# Patient Record
Sex: FEMALE | Race: BLACK OR AFRICAN AMERICAN
[De-identification: names, ages, dates, MRNs, and addresses within clinical notes are randomized per-mention and may not be internally consistent; named-entity substitution may affect disease eponyms.]

---

## 2017-06-10 NOTE — PDOC
History of Present Illness





- General


Chief Complaint: Lightheaded


Stated Complaint: WEAKNESS


Time Seen by Provider: 06/10/17 17:35


History Source: Patient


Exam Limitations: No Limitations





- History of Present Illness


Initial Comments: 


06/10/17 18:21














My chief complaint: Intermittent headache, weakness, lightheadedness times one 

week








History of present illness: Patient is a 32-year-old female with a history of 

fibroids and myomectomy 8/16 today complaining of intermittent temporal 

headache times one week with lightheadedness and generalized weakness 

intermittently. Patient reports having a headache earlier today for which she 

took acetaminophen at 12 noon, denies having headache presently. Patient 

reports feeling lightheaded when she stands up from a sitting or lying position 

intermittently times one week. Patient denies any fever, sore throat, cough, 

nausea vomiting or diarrhea. Patient does report having nasal congestion that 

was worse 2 weeks ago is slightly less presently with clear rhinorrhea. She 

denies any recent travel or any sick contacts. Patient reports that she works 

nights and comes home and sleeps for only 4 hours which is usual for her. 

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 

semester and is less stress than usual. Patient denies any palpitations or any 

shortness of breath. She reports that her menstrual cycle is not heavy like 

before and she just finished it yesterday was for 3 days only. Patient to think 

that she is pregnant due to having her menstrual cycle is not on birth control.


06/10/17 18:28








06/10/17 18:29








06/10/17 18:32





Timing/Duration: 1 week (intermittent lightheadedness, headache b/l frontal, 

weakness intermittent)


Severity: mild


Associated Symptoms: reports: headaches (intermittent headache temporal, none 

now ), other (nasal congestion, weakness, lightheadedness)





Past History





- Past Medical History


Allergies/Adverse Reactions: 


 Allergies











Allergy/AdvReac Type Severity Reaction Status Date / Time


 


oxycodone HCl Allergy Mild Itching Verified 06/10/17 17:21





[From Roxicodone]     


 


mushroom Allergy   Verified 06/10/17 17:21











Home Medications: 


Ambulatory Orders





Cholecalciferol (Vitamin D3) [Vitamin D] 1,000 unit PO DAILY 08/22/16 


Multivit with Iron-Minerals [Compete] 1 each PO DAILY 08/22/16 


Fexofenadine HCl [Allegra Allergy] 180 mg PO DAILY #10 tablet 06/10/17 


Fluticasone Prop 0.05% Nasal [Flonase] 2 spray NS DAILY #1 spraybtl 06/10/17 








Anemia: No


Asthma: No


Cancer: No


Cardiac Disorders: No


CVA: No


COPD: No


CHF: No


Dementia: No


Diabetes: No


GI Disorders: No


 Disorders: No


HTN: No


Hypercholesterolemia: No


Liver Disease: No


Seizures: No


Thyroid Disease: No


Other medical history: DENIES





- Immunization History


Immunization Up to Date: Yes





- Psycho/Social/Smoking Cessation Hx


Anxiety: No


Suicidal Ideation: No


Smoking History: Never smoked


Have you smoked in the past 12 months: No


Hx Alcohol Use: No


Drug/Substance Use Hx: No


Substance Use Type: None


Hx Substance Use Treatment: No





**Review of Systems





- Review of Systems


Able to Perform ROS?: Yes


Constitutional: Yes: Weakness (intermittent )


HEENTM: Yes: Nose Congestion


Respiratory: No: Symptoms reported


Cardiac (ROS): Yes: Lightheadedness


ABD/GI: No: Symptoms Reported


: No: Symptoms Reported


Musculoskeletal: No: Symptoms Reported


Integumentary: No: Symptoms Reported


Neurological: Yes: Headache (intermittent temporal b/l headache daily for one 

week )





*Physical Exam





- Vital Signs


 Last Vital Signs











Temp Pulse Resp BP Pulse Ox


 


 97.8 F   63   16   110/69   100 


 


 06/10/17 17:12  06/10/17 17:12  06/10/17 17:12  06/10/17 17:12  06/10/17 17:12














- Physical Exam


General Appearance: Yes: Appropriately Dressed


HEENT: positive: EOMI, GARRICK, Nasal Congestion (b/l edema of superior turbinates 

rt. greater than left ).  negative: Pharyngeal Erythema, Tonsillar Exudate, 

Tonsillar Erythema


Neck: negative: Lymphadenopathy (R), Lymphadenopathy (L)


Respiratory/Chest: positive: Lungs Clear, Normal Breath Sounds.  negative: 

Chest Tender, Respiratory Distress


Cardiovascular: positive: Regular Rhythm, Regular Rate, S1, S2


Gastrointestinal/Abdominal: positive: Normal Bowel Sounds, Soft.  negative: 

Tender, Organomegaly, Distended, Guarding, Rebound, Tenderness, Hepatomegaly, 

Spleenomegaly


Integumentary: positive: Normal Color


Neurologic: positive: CNs II-XII NML intact, Fully Oriented, Alert, Normal 

Response, Respond to painful stimul, Responsive, Finger to Nose.  negative: 

Numbness, Sensory Deficit





ED Treatment Course





- LABORATORY


CBC & Chemistry Diagram: 


 06/10/17 18:00





 06/10/17 18:00





Medical Decision Making





- Medical Decision Making


06/10/17 18:32


Patient is a 32-year-old female with a history of fibroids and myomectomy 8/16 

with normocytic anemia here today complaining of intermittent temporal headache 

times one week with lightheadedness and generalized weakness intermittently. 

Patient reports having a headache earlier today for which she took 

acetaminophen at 12 noon, denies having headache presently. Patient reports 

feeling lightheaded when she stands up from a sitting or lying position 

intermittently times one week. Patient denies any fever, sore throat, cough, 

nausea vomiting or diarrhea. Patient does report having nasal congestion that 

was worse 2 weeks ago is slightly less presently with clear rhinorrhea. She 

denies any recent travel or any sick contacts. Patient reports that she works 

nights and comes home and sleeps for only 4 hours which is usual for her. 

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 

semester and is less stress than usual. Patient denies any palpitations or any 

shortness of breath. She reports that her menstrual cycle is not heavy like 

before and she just finished it yesterday was for 3 days only. Patient to think 

that she is pregnant due to having her menstrual cycle is not on birth control.


06/10/17 18:28





06/10/17 18:34


r/o worsening anemia


Nasal Congestion 














PLAN:








cbc with diff


bmp


urine hcg








06/10/17 18:36








06/10/17 18:51


 Laboratory Tests











  06/10/17 06/10/17 06/10/17





  18:00 18:00 18:00


 


WBC  3.0 L D  


 


RBC  4.43  


 


Hgb  12.1  D  


 


Hct  38.1  D  


 


MCV  85.9  


 


MCHC  31.9 L  


 


RDW  13.4  


 


Plt Count  154  D  


 


MPV  10.6  


 


Neutrophils %  39.2 L  


 


Lymphocytes %  43.8 H  


 


Monocytes %  8.2  


 


Eosinophils %  7.8 H  


 


Basophils %  1.0  


 


Sodium    141


 


Potassium    4.0


 


Chloride    104


 


Carbon Dioxide    28


 


Anion Gap    9


 


BUN    13  D


 


Creatinine    0.7


 


Random Glucose    79


 


Calcium    9.3


 


Urine HCG, Qual   Negative 














06/10/17 18:56








leukopenia has been chronic 


will discharge to home on allegra 180 mg daily for 10 days


flonase 2 sprays each nostril for 10 days





06/10/17 23:10








06/10/17 23:11








*DC/Admit/Observation/Transfer


Diagnosis at time of Disposition: 


 Nasal congestion





Headache


Qualifiers:


 Headache type: unspecified Headache chronicity pattern: acute headache 

Intractability: not intractable Qualified Code(s): R51 - Headache





- Discharge Dispostion


Disposition: HOME


Condition at time of disposition: Stable





- Prescriptions


Prescriptions: 


Fexofenadine HCl [Allegra Allergy] 180 mg PO DAILY #10 tablet


Fluticasone Prop 0.05% Nasal [Flonase] 2 spray NS DAILY #1 spraybtl





- Referrals


Referrals: 


Niels Martin MD [Primary Care Provider] - 


Karlos De La O MD [Staff Physician] - 





- Patient Instructions


Additional Instructions: 











follow up With ear nose and throat Dr. De La O if symptoms persist











More rest and follow-up with your primary care provider next week











Return to emergency room if symptoms worsen or new symptoms develop














Patient voiced understanding of discharge instructions and all questions were 

answered

## 2018-01-06 NOTE — PDOC
History of Present Illness





- General


Chief Complaint: Cold Symptoms


Stated Complaint: 19 wks preg COUGHING


Time Seen by Provider: 01/06/18 18:39





- History of Present Illness


Initial Comments: 





01/06/18 18:50


CHIEF COMPLAINT: cough, fever





HISTORY OF PRESENT ILLNESS:  33 yo 19 week pregnant F with no significant PMH 

presents to fast track with cough and fever x 2 days. Patient denies sore throat

, vomiting, diarrhea, abdominal pain, vaginal bleeding.





PAST MEDICAL HISTORY: Denies past medical history





FAMILY HISTORY: Denies





SOCIAL HISTORY: Denies tobacco, alcohol, illicit drug use. 





SURGICAL HISTORY: Denies





ALLERGIES: oxycodone, mushroom





REVIEW OF SYSTEMS


General/Constitutional: Denies fever or chills. Denies weakness.





HEENT: Denies change in vision. Denies ear pain or discharge. Denies sore 

throat.





Cardiovascular: Denies chest pain or shortness of breath.





Respiratory: "I'm coughing a lot." Denies wheezing, or hemoptysis.





Gastrointestinal: Denies nausea, vomiting, diarrhea. 





Genitourinary: Denies dysuria, frequency, or change in urination.





Musculoskeletal: Denies joint or muscle swelling or pain. Denies neck or back 

pain.





Skin and breasts: Denies rash or easy bruising.





PHYSICAL EXAM


General Appearance: Well-appearing, appropriately dressed.  No apparent distress

, no intoxication.





HEENT: EOMI, PERRLA, normal ENT inspection, normal voice, TMs normal, pharynx 

normal.  No conjunctival pallor.  No photophobia, scleral icterus.





Neck: Supple.  Trachea midline. No tenderness, rigidity, carotid bruit, stridor

, lymphadenopathy, or thyromegaly. 





Respiratory/Chest: Lungs CTAB.  No shortness of breath, chest tenderness, 

respiratory distress, accessory muscle use. No crackles, rales, rhonchi, stridor

, wheezing, dullness





Cardiovascular: RRR. S1, S2.  No JVD, murmur, bradycardia, tachycardia.





Gastrointestinal/Abdominal: Normal bowel sounds.  Abdomen soft, non-distended.  

No tenderness or rebound tenderness. No  organomegaly, pulsatile mass, guarding

, hernia, hepatomegaly, splenomegaly.





Musculoskeletal/Extremities:  Normal inspection. FROM of all extremities, 

normal capillary refill.  Pelvis Stable.  No CVA tenderness. No tenderness to 

extremities, pedal edema, swelling, erythema or deformity.





Integumentary: Appropriate color, dry, warm.  No cyanosis, erythema, jaundice 

or rash





Neurologic: CNs II-XII intact. Fully oriented, alert.  Appropriate mood/affect. 

Motor strength 5/5.  No appreciable EOM palsy, facial droop or sensory deficit.





Past History





- Past Medical History


Allergies/Adverse Reactions: 


 Allergies











Allergy/AdvReac Type Severity Reaction Status Date / Time


 


oxycodone HCl Allergy Mild Itching Verified 01/06/18 18:18





[From Roxicodone]     


 


mushroom Allergy   Verified 01/06/18 18:18











Home Medications: 


Ambulatory Orders





Multivit with Iron,Minerals [Compete] 1 each PO DAILY 08/22/16 








Anemia: No


Asthma: No


Cancer: No


Cardiac Disorders: No


CVA: No


COPD: No


CHF: No


Dementia: No


Diabetes: No


GI Disorders: No


 Disorders: No


HTN: No


Hypercholesterolemia: No


Liver Disease: No


Seizures: No


Thyroid Disease: No





- Immunization History


Immunization Up to Date: Yes





- Suicide/Smoking/Psychosocial Hx


Smoking History: Never smoked


Have you smoked in the past 12 months: No


Information on smoking cessation initiated: No


Hx Alcohol Use: No


Drug/Substance Use Hx: No


Substance Use Type: None


Hx Substance Use Treatment: No





*Physical Exam





- Vital Signs


 Last Vital Signs











Temp Pulse Resp BP Pulse Ox


 


 98.1 F   77   18   99/63   100 


 


 01/06/18 18:19  01/06/18 18:19  01/06/18 18:19  01/06/18 18:19  01/06/18 18:19














Medical Decision Making





- Medical Decision Making





01/06/18 19:04


33 yo 19 week pregnant F with no significant PMH presents to fast track with 

cough and fever x 2 days. 





-flu swab











*DC/Admit/Observation/Transfer


Diagnosis at time of Disposition: 


 Viral syndrome








- Discharge Dispostion


Disposition: HOME


Condition at time of disposition: Stable


Admit: No





- Referrals


Referrals: 


Carie Leonardo MD [Primary Care Provider] - 





- Patient Instructions


Printed Discharge Instructions:  DI for Viral Upper Respiratory Infection -- 

Adult


Additional Instructions: 


Please continue to take Tylenol and Robitussin for your symptoms.  You may also 

try taking Vitamin C supplements to help increase your recovery time.  Be sure 

to drink plenty of fluids and get lots of rest!! If you develop any vaginal 

bleeding or unusual discharge, abdominal pain, or any new or worsening symptoms

, please return to the ER. 





- Post Discharge Activity


Forms/Work/School Notes:  Back to Work

## 2018-05-17 NOTE — OP
DATE OF OPERATION:  2018

 

PREOPERATIVE DIAGNOSIS:  Previous myomectomy at 38 weeks.

 

OPERATION:  Low transverse  section.

 

POSTOPERATIVE DIAGNOSIS:  Live female infant, previous myomectomy at 38 weeks.

 

SURGEON:  Tom Flores MD 

 

ASSISTANT:  YOLANDA Manzano.  MD unavailable.  

 

ANESTHESIA:  Spinal.

 

ANESTHESIOLOGIST:  Candace Mazariegos MD 

 

ESTIMATED BLOOD LOSS:  600 mL.

 

DESCRIPTION OF PROCEDURE:  The patient was taken to the operating room and 
placed in

supine position, prepped and draped in the usual sterile fashion after spinal

anesthesia and Collins catheter had been inserted.  Previous scar was then 
removed with

a scalpel using a Pfannenstiel skin incision.  Cautery was then used to go 
through

layers of the abdominal wall to the level of the fascia.  Fascia was cut in the

midline.  Cautery was then used to open the fascia in a smiling fashion.  
Kochers

were then used to bluntly and sharply dissect the rectus muscles off the 
fascia. 

Muscles were split in the midline, and peritoneal cavity was then entered and 
carried

upward and downward.  Bladder retractor was then placed, and scalpel was then 
used to

make a low transverse uterine incision.  Incision was carried upward using 
bandage

scissors.  A live female infant was delivered in OT position.  Nose and mouth 
suction

performed.  Shoulders were delivered without difficulty.  Cord was clamped and 
cut. 

Cord blood obtained.  Cord pH obtained.  The infant was handed to the 
neonatologist. 

Placenta was manually extracted from the uterus.  Uterus was exteriorized and 
cleaned

with clean laparotomy pads.  Uterine incision then closed using 0 Biosyn suture 
1st

layer continuous and locking, 2nd layer imbricating the 1st layer.  Hemostasis

achieved using figure-of-eight sutures.  Uterus interiorized.  Tubes and ovaries

noted to be normal.  Abdominal sweep done.  Abdominal cavity cleaned with clean 
lap

pads.  Peritoneum closed using 0 Biosyn suture.  Fascia was then closed using 0

Vicryl suture in 2 parts.  Muscle was approximated using 0 Vicryl suture.  The

subcutaneous was then closed using 0 Vicryl suture.  The skin was then closed 
using

3-0 Vicryl in subcuticular fashion.  The wound was washed and dressed.  The 
patient

tolerated the procedure well.  Steri-Strips placed.  

 

 

 

TOM FLORES M.D.

 

MICHAEL/[id number]

DD: 2018 11:15

DT: 2018 11:26

Job #:  [id number]

MTDD

## 2018-05-17 NOTE — HP
Past Medical History





- Primary Care Physician


PCP:: Jada Gottlieb





- Admission


Chief Complaint: Previous Myomectomy


History of Present Illness: 





34yo  EDC 18 ega 38 week with previous myomectomy for primary 

 section


no ROM bleeding + afm no HAS


History Source: Patient


Limitations to Obtaining History: No Limitations





- Past Medical History


...: 2


...Para: 0


...Term: 0


...: 0


...Spon : 1


...Induced : 0


...Multiple Gestation: 0


...LMP: 17


... Weeks Gestation by Dates: 38.1


...EDC by Dates: 18


...EDC by Sono: 18





- Past Surgical History


Hx Myomectomy: No


Hx Transabdominal Cerclage: No


Additional Surgical History: Abdominal myomectomy





- Smoking History


Smoking history: Never smoked


Have you smoked in the past 12 months: No





- Alcohol/Substance Use


Hx Alcohol Use: No


History of Substance Use: reports: None





- Social History


Usual Living Arrangement: Yes: With Spouse





Home Medications





- Allergies


Allergies/Adverse Reactions: 


 Allergies











Allergy/AdvReac Type Severity Reaction Status Date / Time


 


oxycodone HCl Allergy Mild Itching Verified 18 09:01





[From Roxicodone]     


 


mushroom Allergy   Verified 18 09:01














- Home Medications


Home Medications: 


Ambulatory Orders





Ferrous Sulfate 325 mg PO DAILY 18 


Prenatal Vitamins (Sjr) - 1 tab PO DAILY 18 











Review of Systems





- Review of Systems


Constitutional: reports: No Symptoms


Eyes: reports: No Symptoms


HENT: reports: No Symptoms


Neck: reports: No Symptoms


Cardiovascular: reports: No Symptoms


Respiratory: reports: No Symptoms


Gastrointestinal: reports: No Symptoms


Genitourinary: reports: No Symptoms


Breasts: reports: No Symptoms Reported


Musculoskeletal: reports: No Symptoms


Integumentary: reports: No Symptoms


Neurological: reports: No Symptoms


Endocrine: reports: No Symptoms


Hematology/Lymphatic: reports: No Symptoms


Psychiatric: reports: No Symptoms





Physical Exam - Maternity


Vital Signs: 


 Vital Signs











Temperature  98.2 F   18 08:30


 


Pulse Rate  79   18 08:30


 


Respiratory Rate  20   18 08:30


 


Blood Pressure  107/70   18 08:30


 


O2 Sat by Pulse Oximetry (%)      











Constitutional: Yes: Well Nourished, No Distress


Cardiovascular: Yes: WNL


Lungs: Clear to auscultation


Breast(s): Yes: WNL





- Abdominal Exam/OB


Fundal Height: 38


Number of Fetuses: Single


Fetal Presentation: Vertex


Contractions: No


Fetal Monitor Mode: External


Category: I


Accelerations: Non-Uniform


Decelerations: None





- Vaginal Exam/OB


Vaginal Bleediing: No


Dilatation (cm): closed


Amniotic Membrane Status: Intact


Fetal Presentation: Vertex/Position





- Physical Exam


Musculoskeletal: Yes: WNL


Psychiatric: Yes: WNL, Alert, Oriented





Hemorrhage Risk Assessment





- Risk Factors


Medium Risk Factors: Yes: Prior , uterine surgery,or multiple 

laparotomies


Risk Score: 1


Risk Level: Medium Risk





Problem List





- Problems


(1) History of myomectomy


Code(s): Z98.890 - OTHER SPECIFIED POSTPROCEDURAL STATES   





(2) 38 weeks gestation of pregnancy


Code(s): Z3A.38 - 38 WEEKS GESTATION OF PREGNANCY   





Assessment/Plan





Previous myomectomy


IUP at 38 week


Cat 1





Plan


 Section

## 2018-05-17 NOTE — OP
Operative Note





- Note:


Operative Date: 18


Pre-Operative Diagnosis: Previous Myomectomy.  IUP at 40 weeks by usg


Operation: Primary low transverse  Section


Findings: 





live female infant


Post-Operative Diagnosis: Same as Pre-op


Surgeon: Jada Gottlieb


Assistant: Forrest Antonio


Anesthesia: Spinal


Specimens Removed: placenta


Estimated Blood Loss (mls): 600


Operative Report Dictated: Yes

## 2018-05-18 NOTE — PN
Progress Note (short form)





- Note


Progress Note: 





Anesthesia postop note





34 y/o F s/p spinal anesthesia/duramorph for  section


POD#1, vss, aaox3, ambulated earlier, pain fairly well controlled.


No anesthesia complications.

## 2018-05-18 NOTE — PN
Progress Note (SOAP)





- Subjective


Chief Complaint: 





Pt with c/o of pain and gas pain





- Current Medications


Current Medications: 


Active Medications





Bisacodyl (Dulcolax Suppository -)  10 mg RC PRN PRN


   PRN Reason: CONSTIPATION


Diphenhydramine HCl (Benadryl Injection -)  25 mg IVPUSH Q4H PRN


   PRN Reason: Pruritis


   Last Admin: 05/17/18 16:51 Dose:  25 mg


Hydromorphone HCl (Dilaudid -)  2 mg PO Q4H PRN


   PRN Reason: PAIN 7-10


   Last Admin: 05/18/18 13:13 Dose:  2 mg


Parenteral Electrolytes (Plasma-Lyte 148 -)  1,000 mls @ 125 mls/hr IV [facility]


   Last Admin: 05/17/18 09:55 Dose:  125 mls/hr


Oxytocin/Sodium Chloride (Normal Saline+20 Units Oxytocin -)  20 unit in 1,000 

mls @ 125 mls/hr IV [facility]


   Last Admin: 05/17/18 14:04 Dose:  125 mls/hr


Parenteral Electrolytes (Plasma-Lyte 148 -)  1,000 mls @ 125 mls/hr IV [facility]


   Last Admin: 05/17/18 12:37 Dose:  Not Given


Ibuprofen (Motrin -)  600 mg PO Q4H PRN


   PRN Reason: PAIN LEVEL 1 - 3


   Last Admin: 05/18/18 14:49 Dose:  600 mg


Ibuprofen (Caldolor Injection -)  800 mg IVPB Q8H PRN


   PRN Reason: PAIN GREATER THAN 5


   Last Admin: 05/18/18 03:43 Dose:  800 mg


Methylergonovine Maleate (Methergine Injection -)  0.2 mg IM Q4H PRN


   PRN Reason: Excessive Bleeding (L&D)


Ondansetron HCl (Zofran Injection)  4 mg IVPUSH Q4H PRN


   PRN Reason: NAUSEA


Oxycodone HCl (Roxicodone -)  5 mg PO Q4H PRN


   PRN Reason: PAIN LEVEL 4 - 6


Oxycodone HCl (Roxicodone -)  10 mg PO Q4H PRN


   PRN Reason: PAIN LEVEL 7 - 10


Simethicone (Mylicon -)  80 mg PO Q4H PRN


   PRN Reason: GAS


   Last Admin: 05/18/18 13:14 Dose:  80 mg











- Objective


Vital Signs: 


 Vital Signs











Temperature  98.5 F   05/18/18 10:00


 


Pulse Rate  80   05/18/18 10:00


 


Respiratory Rate  20   05/18/18 10:00


 


Blood Pressure  99/55   05/18/18 10:00


 


O2 Sat by Pulse Oximetry (%)  100   05/17/18 12:15











Constitutional: Yes: Well Nourished, No Distress


Respiratory: Yes: WNL, Regular, CTA Bilaterally


Gastrointestinal: Yes: WNL, Normal Bowel Sounds


....Post Partum: Yes: Uterus firm, Uterus non-tender


Musculoskeletal: Yes: WNL


Extremities: Yes: WNL


Edema: Yes


Wound/Incision: Yes: Clean/Dry, Well Approximated





Labs


Lab Results: 


 CBC, BMP





 05/18/18 07:06 











Problem List





- Problems


(1) History of myomectomy


Code(s): Z98.890 - OTHER SPECIFIED POSTPROCEDURAL STATES   





Assessment/Plan





Previous myomectomy


POD 1 





Plan


Dilaudid


demerol

## 2018-05-19 NOTE — PN
Post Partum Progress Note





- Subjective


Subjective: 





c/o hemorroidal pain and incisional discomfort 


Post Partum Day: 2


Type of Delivery: Primary C/S


Vital Signs: 


 Vital Signs











Temperature  98.0 F   05/19/18 05:59


 


Pulse Rate  70   05/19/18 05:59


 


Respiratory Rate  20   05/19/18 05:59


 


Blood Pressure  110/64   05/19/18 05:59


 


O2 Sat by Pulse Oximetry (%)  100   05/17/18 12:15











Breast Exam: Yes: Soft


Uterus: Yes: Fundus Firm


Incision: Yes: Dressing dry and intact


Abdomen/GI: Yes: Abdomen soft


Lochia: Yes: Serosa


Lochia, amount: Moderate


Extremities: Yes: Calves non-tender


Perineum: Yes: Intact


Activity: Ambulating





- Labs


Labs: 


 CBC











WBC  7.3 K/mm3 (4.0-10.0)  D 05/18/18  07:06    


 


RBC  3.43 M/mm3 (3.60-5.2)  L  05/18/18  07:06    


 


Hgb  10.3 GM/dL (10.7-15.3)  L D 05/18/18  07:06    


 


Hct  30.1 % (32.4-45.2)  L D 05/18/18  07:06    


 


MCV  87.8 fl (80-96)   05/18/18  07:06    


 


MCH  30.1 pg (25.7-33.7)   05/18/18  07:06    


 


MCHC  34.3 g/dl (32.0-36.0)   05/18/18  07:06    


 


RDW  13.6 % (11.6-15.6)   05/18/18  07:06    


 


Plt Count  136 K/MM3 (134-434)   05/18/18  07:06    


 


MPV  9.8 fl (7.5-11.1)  D 05/18/18  07:06    


 


Neutrophils %  79.5 % (42.8-82.8)   05/18/18  07:06    


 


Lymphocytes %  14.2 % (8-40)  D 05/18/18  07:06    


 


Monocytes %  5.3 % (3.8-10.2)   05/18/18  07:06    


 


Eosinophils %  0.8 % (0-4.5)   05/18/18  07:06    


 


Basophils %  0.2 % (0-2.0)   05/18/18  07:06    














Assessment/Plan





hemorroidal inflammatio 


gas oain 


pain management 


continue post partum care

## 2018-05-20 NOTE — PN
Post Partum Progress Note





- Subjective


Subjective: 





34 yo status post  delivery of a live infant, seen and evaluated.


She continues to c/o abdominal pain despite Mylicon and supository.





Post Partum Day: 3


Type of Delivery: Primary C/S


Vital Signs: 


 Vital Signs











Temperature  99.1 F   18 08:32


 


Pulse Rate  90   18 08:32


 


Respiratory Rate  20   18 08:32


 


Blood Pressure  106/61   18 08:32


 


O2 Sat by Pulse Oximetry (%)  100   18 12:15











Breast Exam: Yes: Soft


Uterus: Yes: Other (Mildly distented)


Incision: Yes: Sutures intact


Abdomen/GI: Yes: Abdomen soft, Abdominal Distention


Lochia: Yes: Rubra


Lochia, amount: Small


Extremities: Yes: Calves non-tender


Perineum: Yes: Intact


Activity: Other (She's lying in bed)





- Labs


Labs: 


 CBC











WBC  4.4 K/mm3 (4.0-10.0)  D 18  08:00    


 


RBC  3.49 M/mm3 (3.60-5.2)  L  18  08:00    


 


Hgb  10.3 GM/dL (10.7-15.3)  L  18  08:00    


 


Hct  30.8 % (32.4-45.2)  L  18  08:00    


 


MCV  88.3 fl (80-96)   18  08:00    


 


MCH  29.5 pg (25.7-33.7)   18  08:00    


 


MCHC  33.4 g/dl (32.0-36.0)   18  08:00    


 


RDW  14.0 % (11.6-15.6)   18  08:00    


 


Plt Count  178 K/MM3 (134-434)  D 18  08:00    


 


MPV  9.6 fl (7.5-11.1)   18  08:00    


 


Neutrophils %  67.7 % (42.8-82.8)   18  08:00    


 


Lymphocytes %  21.4 % (8-40)  D 18  08:00    


 


Monocytes %  7.4 % (3.8-10.2)   18  08:00    


 


Eosinophils %  3.0 % (0-4.5)  D 18  08:00    


 


Basophils %  0.5 % (0-2.0)   18  08:00    














Assessment/Plan





Status post 


Abdomonal pain


Abdominal Xray


Continue Simethicone and analgesia

## 2018-05-20 NOTE — CONSULT
Consult


Consult Specialty:: General Surgery


Referred by:: GUERRERO Meeks


Reason for Consultation:: abdominal pain, constipation, distention post c/s





- History of Present Illness


Chief Complaint: abdominal pain, constipation


History of Present Illness: 





34yo healthy F  s/p scheduled  3d ago secondary to h/o myomectomy 

last year (first child), with h/o constipation, hemorrhoids, anemia, who has 

been increasingly constipated since delivery, with abdominal pain, distention, 

gas pains, and decreased appetite. Pregnancy was full-term, normal and baby 

girl healthy. AXR today showed copious stool and some gaseous distention in 

upper abdomen. Surgery consulted to r/o ileus/obstruction. Pt has been OOB and 

ambulating, though not much per nursing, and did manage a little bit of 

breakfast. No N/V, + passing gas, had small BM day before yesterday. Had 

suppository once, but keeps feeling like she might be ready to go, but it won't 

come out, and the pain starts epigastric then settles down into pelvis. She has 

also had some shoulder pain. She has been taking Dilaudid po for pain secondary 

to Oxycodone allergy (severe itching), but reports after the first postop day, 

she really only started needing it again when the pain got worse in her 

abdomen. She had been on PNV and iron supplements for anemia prior to delivery 

as well and used prune juice during pregnancy to help with constipation. She 

does not tend to drink a lot of water/fluids. 





- History Source


History Provided By: Patient


Limitations to Obtaining History: No Limitations





- Past Medical History


Gastrointestinal: Yes: Constipation, Hemorrhoids


Reproductive: Yes: Fibroids


...LMP: /


...Pregnant: No


...: 2


...Para: 1 (3d postpartum 1st delivery)


Heme/Onc: Yes: Anemia





- Past Surgical History


Additional Surgical History: Abdominal myomectomy





- Alcohol/Substance Use


Hx Alcohol Use: Yes (rarely (not while pregnant))


History of Substance Use: reports: None





- Smoking History


Smoking history: Never smoked


Have you smoked in the past 12 months: No





- Social History


Usual Living Arrangement: With Spouse


ADL: Independent





Home Medications





- Allergies


Allergies/Adverse Reactions: 


 Allergies











Allergy/AdvReac Type Severity Reaction Status Date / Time


 


oxycodone HCl Allergy Mild Itching Verified 18 09:01





[From Roxicodone]     


 


mushroom Allergy   Verified 18 09:01














- Home Medications


Home Medications: 


Ambulatory Orders





Ferrous Sulfate 325 mg PO DAILY 18 


Prenatal Vitamins (Sjr) - 1 tab PO DAILY 18 











Family Disease History





- Family Disease History


Family History: Unremarkable (noncontributory)





Review of Systems





- Review of Systems


Constitutional: reports: Loss of Appetite.  denies: Chills, Fever


Eyes: denies: Blurred Vision, Recent Change in Vision


HENT: reports: Nasal Congestion (recent cold - better now).  denies: Difficult 

Swallowing, Throat Pain


Neck: denies: Swollen Glands, Tenderness


Cardiovascular: denies: Chest Pain (with abdominal pain only, secondary to gas 

pains), Palpitations


Respiratory: denies: Cough, SOB


Gastrointestinal: reports: Abdominal Pain (with hpi), Bloating, Constipation.  

denies: Diarrhea, Nausea, Vomiting


Genitourinary: denies: Burning, Dysuria


Musculoskeletal: reports: Back Pain (only in last few days).  denies: Joint Pain

, Muscle Pain


Integumentary: reports: Incision (Pfannenstiel).  denies: Change in Color, Rash


Neurological: denies: Dizziness, Headache





Physical Exam


Vital Signs: 


 Vital Signs











Temperature  99.1 F   18 08:32


 


Pulse Rate  90   18 08:32


 


Respiratory Rate  20   18 08:32


 


Blood Pressure  106/61   18 08:32


 


O2 Sat by Pulse Oximetry (%)  100   18 12:15











Constitutional: Yes: Well Nourished, No Distress, Calm


Eyes: Yes: Conjunctiva Clear, EOM Intact


HENT: Yes: Atraumatic, Normocephalic


Neck: Yes: Supple, Trachea Midline


Cardiovascular: Yes: Regular Rate and Rhythm.  No: Murmur


Respiratory: Yes: Regular, CTA Bilaterally


Gastrointestinal: Yes: Normal Bowel Sounds, Soft, Distention (mild tympany, 

postpartum), Hernia (small reducible umbilical), Tenderness (diffuse, no R/G), 

Other (Pfannenstiel incision with steristrips)


...Rectal Exam: Yes: Hemorrhoids/External, Sphincter Tone Normal, Other (no 

stool palpable or on glove)


Renal/: No: CVA Tenderness - Left, CVA Tenderness - Right


Musculoskeletal: No: Joint Stiffness, Joint Swelling


Extremities: No: Cool, Cyanosis


Edema: Yes


Edema: LLE: Trace, RLE: Trace


Integumentary: Yes: Incision (Pfannenstiel with steristrips).  No: Rash


Wound/Incision: Yes: Clean/Dry, Well Approximated, Steri Strips, Open to air


Neurological: Yes: Alert, Oriented.  No: Unsteady Gait


Labs: 


 CBC, BMP





 18 08:00 











Imaging





- Results


X-ray: Report Reviewed, Image Reviewed (images personally reviewed - AXR with 

copious stool throughout colon, but gas in rectal vault area, some gaseous 

distention in upper abdomen, ?uterine shadow in pelvis)





Problem List





- Problems


(1) Constipation due to opioid therapy


Assessment/Plan: 


Patient with severe constipation, multifactorial - iron therapy, opioid use, 

less than adequate fluid intake


Needs aggressive bowel regimen and to avoid narcotics


Will start without enema because of hemorrhoids and empty rectal vault


Senna and magnesium citrate now


Stool softener tid


Advised and encouraged liberal noncaffeinated fluid intake


ok to use prune juice as well


Alternating tylenol and ibuprofen for pain prn


Avoid dilaudid if at all possible


Once bowels are moving and most stool is evacuated, will need to continue 

softener TID at home


can continue to use prune juice as well, and senna or dulcolax po prn at home 

too


Code(s): K59.03 - DRUG INDUCED CONSTIPATION; T40.2X5A - ADVERSE EFFECT OF OTHER 

OPIOIDS, INITIAL ENCOUNTER   





(2) Disease of digestive system complicating puerperium


Code(s): O99.63 - DISEASES OF THE DIGESTIVE SYSTEM COMPLICATING THE PUERPERIUM 

  





(3) Abdominal pain, generalized


Code(s): R10.84 - GENERALIZED ABDOMINAL PAIN   





(4) Hemorrhoids, external without complications


Assessment/Plan: 


ok to continue topical therapy for hemorrhoids - Prep H at home is fine


Code(s): K64.4 - RESIDUAL HEMORRHOIDAL SKIN TAGS   





Assessment/Plan





Thank you for the opportunity to participate in the care of this patient.

## 2018-05-21 NOTE — PN
Progress Note, Physician


History of Present Illness: 





Pt with constipation s/p . Had multiple bowel meds yesterday with good 

effect - per pt, has had more than 7 BMs and is feeling better. Still with some 

gas and distention, but much improved. Not using dilaudid anymore. Tolerating 

diet. 





- Current Medication List


Current Medications: 


Active Medications





Acetaminophen (Tylenol -)  650 mg PO Q6H PRN


   PRN Reason: PAIN LEVEL 4 - 6


   Last Admin: 18 01:54 Dose:  650 mg


Benzocaine (Americaine Ointment -)  1 applic TP PRN PRN


   PRN Reason: HEMORRHOIDS


   Last Admin: 18 13:44 Dose:  1 applic


Benzocaine (Americaine 20% Spray -)  1 spray TP PRN PRN


   PRN Reason: HEMORRHOIDS


   Last Admin: 18 09:20 Dose:  1 spray


Bisacodyl (Dulcolax Suppository -)  10 mg RC PRN PRN


   PRN Reason: CONSTIPATION


   Last Admin: 18 15:36 Dose:  10 mg


Diphenhydramine HCl (Benadryl Injection -)  25 mg IVPUSH Q4H PRN


   PRN Reason: Pruritis


   Last Admin: 18 16:51 Dose:  25 mg


Docusate Sodium (Colace -)  100 mg PO TID [facility]


   Last Admin: 18 06:19 Dose:  100 mg


Ferrous Sulfate (Feosol -)  325 mg PO DAILY [facility]


   Last Admin: 18 09:16 Dose:  325 mg


Hydromorphone HCl (Dilaudid -)  4 mg PO Q6H PRN


   PRN Reason: breakthrough pain 8-10


Oxytocin/Sodium Chloride (Normal Saline+20 Units Oxytocin -)  20 unit in 1,000 

mls @ 125 mls/hr IV ASDIR [facility]


   Last Admin: 18 14:04 Dose:  125 mls/hr


Parenteral Electrolytes (Plasma-Lyte 148 -)  1,000 mls @ 125 mls/hr IV ASDIR [facility]


   Last Admin: 18 12:37 Dose:  Not Given


Ibuprofen (Motrin -)  600 mg PO Q6H PRN


   PRN Reason: PAIN LEVEL 6-10


Methylergonovine Maleate (Methergine Injection -)  0.2 mg IM Q4H PRN


   PRN Reason: Excessive Bleeding (L&D)


Ondansetron HCl (Zofran Injection)  4 mg IVPUSH Q4H PRN


   PRN Reason: NAUSEA


Prenatal Multivit/Folic Acid/Iron (Prenatal Vitamins (Sjr) -)  1 tab PO DAILY 

MONSE


   Last Admin: 18 09:16 Dose:  1 tab


Simethicone (Mylicon -)  80 mg PO Q4H PRN


   PRN Reason: GAS


   Last Admin: 18 22:21 Dose:  80 mg











- Objective


Vital Signs: 


 Vital Signs











Temperature  99.0 F   18 07:20


 


Pulse Rate  72   18 07:20


 


Respiratory Rate  18   18 07:20


 


Blood Pressure  95/51   18 07:20


 


O2 Sat by Pulse Oximetry (%)  100   18 12:15











Constitutional: Yes: Well Nourished, No Distress, Calm


Eyes: Yes: Conjunctiva Clear, EOM Intact


HENT: Yes: Atraumatic, Normocephalic


Gastrointestinal: Yes: Soft, Distention (some upper tympany), Hernia (small 

reducible umbilical), Tenderness (mild incisional and lower abdomen, no R/G)


...Rectal Exam: Yes: Deferred


Extremities: No: Cool, Cyanosis


Integumentary: Yes: Incision (with steris)


Wound/Incision: Yes: Clean/Dry, Well Approximated, Steri Strips


Neurological: Yes: Alert, Oriented.  No: Unsteady Gait


Labs: 


 no new labs








Problem List





- Problems


(1) Constipation due to opioid therapy


Assessment/Plan: 


Patient with severe constipation, multifactorial - iron therapy, opioid use, 

less than adequate fluid intake


Excellent result from meds yesterday


Continue softener TID at home - can reduce if stools get too soft


can continue to use prune juice as well, and senna or dulcolax po prn at home 

too


pain control with alternating tylenol and ibuprofen - avoid narcotics


Pt understands and agrees with plan


Code(s): K59.03 - DRUG INDUCED CONSTIPATION; T40.2X5A - ADVERSE EFFECT OF OTHER 

OPIOIDS, INITIAL ENCOUNTER   





(2) Disease of digestive system complicating puerperium


Code(s): O99.63 - DISEASES OF THE DIGESTIVE SYSTEM COMPLICATING THE PUERPERIUM 

  





(3) Abdominal pain, generalized


Assessment/Plan: 


improved


Code(s): R10.84 - GENERALIZED ABDOMINAL PAIN   





(4) Hemorrhoids, external without complications


Code(s): K64.4 - RESIDUAL HEMORRHOIDAL SKIN TAGS

## 2018-05-25 NOTE — PATH
Surgical Pathology Report



Patient Name:  IMAN RIVERA

Accession #:  [id number]

Med. Rec. #:  [id number]                                                        

   /Age/Gender:  1985 (Age: 33) / F

Account:  [id number]                                                          

             Location: [facility] OBS/GYN

Taken:  2018

Received:  2018

Reported:  2018

Physicians:  Jada Gottlieb M.D.

  



Specimen(s) Received

 PLACENTA 





Clinical History

, SAB x1, previous myomectomy, history of fibroid uterus

38.3 weeks gestation







Final Diagnosis

PLACENTA,  SECTION:

440 g THIRD TRIMESTER PLACENTA WITH TRIVASCULAR UMBILICAL CORD AND PLACENTAL

MEMBRANES WITH RARE MECONIUM-LADEN MACROPHAGES.





***Electronically Signed***

Shelley Drummond M.D.





Gross Description

The specimen is received fresh labeled placenta and is a 440 gram, 16.5 x 15.0 x

3.4 cm. placenta with attached membranes and umbilical cord.  The attached

membranes are tan, translucent with focal opacities and insert marginally.  The

umbilical cord measures 7 cm. in length and averages 1 cm. in diameter.  The

cord inserts eccentrically, 2 cm. to the nearest margin.  No true knots or

strictures are identified. Cut surface of the umbilical cord reveals 3 vessels.

The fetal surface is grey-blue with minimal fibrin deposition and appropriate

caliber vessels.  The maternal surface is red-brown with focal defects. 

Sectioning reveals red-brown, spongy parenchyma.  No lesions are identified. 

Representative sections are submitted in three cassettes as follows: 1- membrane

rolls and umbilical cord; 2-3- full thickness sections of placenta.

/2018



[facility]

## 2018-07-29 NOTE — PDOC
History of Present Illness





<Shena Boss - Last Filed: 18 01:16>





- General


History Source: Patient


Exam Limitations: No Limitations





- History of Present Illness


Initial Comments: 





18 23:39


Brie Lea is a 33 year old female with a history significant for anemia, 

myomectomy for uterine fibroids, and  section 2 months ago. She 

presents to the ED today with right calf pain for 3 days duration. The pain is 

described as burning and constant. The pain is worse with ambulation and 

movement. She has taken Tylenol for pain but has had minimal relief. Patient 

admits to swelling and erythema of the right lower extremity. Patient denies 

any chest pain, shortness of breath, dizziness, fever, abdominal pain, or 

weakness. Patient also notes that she recently was administered a Depot shot 

for contraception and this is a primary concern for a DVT in her leg. 





Social Hx: Denies smoking, ETOH, or recreational drug use. 


Allergies: Oxycodone HCl, 


Medications: Iron, Prenatal vitamins, Tylenol





PMD: Dr. Alvarez


OB/Gyn: Dr. Hernandez








GENERAL/CONSTITUTIONAL: No fever or chills. No weakness. No weight change.


HEAD, EYES, EARS, NOSE AND THROAT: No change in vision. No ear pain or 

discharge. No sore throat.


CARDIOVASCULAR: No chest pain or shortness of breath.


RESPIRATORY: No cough, wheezing, or hemoptysis.


GASTROINTESTINAL: No nausea, vomiting, diarrhea or constipation. No rectal 

bleeding.


GENITOURINARY:No dysuria, frequency, or change in urination.


MUSCULOSKELETAL: + RLE swelling. No joint or muscle pain. No neck or back pain.


SKIN AND BREASTS: No rash or easy bruising.


NEUROLOGIC: +Lightheadedness. No headache,loss of consciousness, or loss of 

sensation.


PSYCHIATRIC: No depression or anxiety.


ENDOCRINE: No increased thirst. No abnormal weight change.


HEMATOLOGIC/LYMPHATIC: + Hx of anemia, No easy bleeding, or history of blood 

clots.


ALLERGIC/IMMUNOLOGIC: No hives or skin allergy. No latex allergy.





GENERAL: The patient is awake, alert, and fully oriented, in mild distress.


HEAD: Normal with no signs of trauma.


EYES: [Pupils equal, round and reactive to light, extraocular movements intact, 

sclera anicteric, conjunctiva clear.]


NECK: [Normal range of motion, supple without lymphadenopathy, JVD, or masses.]


LUNGS: [Breath sounds equal, clear to auscultation bilaterally.  No wheezes, 

and no crackles.]


HEART: [Regular rate and rhythm, normal S1 and S2 without murmur, rub.]


ABDOMEN: [Soft, nontender, normoactive bowel sounds.  No guarding, no rebound.  

No masses. Healed  scars noted without erythema.


EXTREMITIES: + Edema in RLE. Tender to palpation in the right calf. Decreased 

range of motion due to pain. No clubbing or cyanosis. No cords, erythema. 

Normal Pedal pulses.


NEUROLOGICAL: [Cranial nerves II through XII grossly intact.  Normal speech, 

normal gait.]


PSYCH: [Normal mood, normal affect.]


SKIN: [Warm, Dry, normal turgor, no rashes or lesions noted.]





<Marito Byrne - Last Filed: 18 01:28>





- General


Chief Complaint: Pain, Acute


Stated Complaint: RIGHT CALF PAIN


Time Seen by Provider: 18 23:29





Past History





<Shena Boss - Last Filed: 18 01:16>





- Past Medical History


Anemia: No


Asthma: No


Cancer: No


Cardiac Disorders: No


CVA: No


COPD: No


CHF: No


DVT: No


Dementia: No


Diabetes: No


GI Disorders: No


 Disorders: No


HTN: No


Hypercholesterolemia: No


Liver Disease: No


Seizures: No


Thyroid Disease: No





- Immunization History


Immunization Up to Date: Yes





- Suicide/Smoking/Psychosocial Hx


Smoking History: Never smoked


Have you smoked in the past 12 months: No


Information on smoking cessation initiated: No


Hx Alcohol Use: No


Drug/Substance Use Hx: No


Substance Use Type: None


Hx Substance Use Treatment: No





<Marito Byrne - Last Filed: 18 01:28>





- Past Medical History


Allergies/Adverse Reactions: 


 Allergies











Allergy/AdvReac Type Severity Reaction Status Date / Time


 


oxycodone HCl Allergy Mild Itching Verified 18 09:01





[From Roxicodone]     


 


acetaminophen [From Percocet] Allergy   Verified 18 22:03


 


mushroom Allergy   Verified 18 09:01


 


oxycodone [From Percocet] Allergy   Verified 18 22:03











Home Medications: 


Ambulatory Orders





Ferrous Sulfate 325 mg PO DAILY 18 


Prenatal Vitamins (Sjr) - 1 tab PO DAILY 18 


HYDROmorphone [Dilaudid -] 4 mg PO Q8H #21 tablet MDD 2 18 


Shark Liver Oil/Cocoa Butter [Hemorrhoidal Suppositories] 1 each RC DAILY PRN #

20 supp.rect 18 











*Physical Exam





- Vital Signs


 Last Vital Signs











Temp Pulse Resp BP Pulse Ox


 


 98.4 F   67   20   101/58   98 


 


 18 22:04  18 22:04  18 22:04  18 22:04  18 22:04














<Shena Boss - Last Filed: 18 01:16>





- Vital Signs


 Last Vital Signs











Temp Pulse Resp BP Pulse Ox


 


 98.4 F   67   20   101/58   98 


 


 18 22:04  18 22:04  18 22:04  18 22:04  18 22:04














<Marito Byrne - Last Filed: 18 01:28>





ED Treatment Course





- RADIOLOGY


Radiology Studies Ordered: 














 Category Date Time Status


 


 DUPLEX VASCUL US-1 LEG [US] Stat Ultrasound  18 23:38 Ordered














<Marito Byrne - Last Filed: 18 01:28>





Medical Decision Making





- Medical Decision Making








18 01:11


Patient Name: BRIE LEA


THIS IS A PRELIMINARY REPORT FROM IMAGING ON CALL


DATE OF SERVICE: 2018 00:30:42


IMAGES: 40


EXAM: Right lower extremity duplex venous ultrasound


HISTORY: Right leg and calf pain


COMPARISON: None.


FINDINGS:


Negative for right lower extremity deep venous thrombosis.


THIS DOCUMENT HAS BEEN ELECTRONICALLY SIGNED





<Shena Boss - Last Filed: 18 01:16>





- Medical Decision Making





18 23:39


Assessment: Brie Lea is a 33 year old female with a history significant for 

anemia, myomectomy for uterine fibroids, and  section 2 months ago. She 

has new onset right lower extremity pain following recent  section and 

Depot contraception injection. She has concerns for DVT.





Plan: 


- Ultrasound doppler of right lower extremity to rule out DVT. If positive, 

will perform further workup. 


- Offered analgesia for pain but patient declined.





Dr. Boss discussed Ultrasound of report with the patient





18 01:27


I discussed the physical exam findings, ancillary test results and final 

diagnoses with the patient. I answered all of the patient's questions. The 

patient was satisfied with the care received and felt comfortable with the 

discharge plan and treatment plan.  The Patient agrees to follow up with the 

primary care physician within 24-72 hours.





<Marito Byrne - Last Filed: 18 01:28>





*DC/Admit/Observation/Transfer





- Discharge Dispostion


Decision to Admit order: No





<Shena Boss - Last Filed: 18 01:16>





<Marito Byrne - Last Filed: 18 01:28>


Diagnosis at time of Disposition: 


Leg pain


Qualifiers:


 Laterality: right Qualified Code(s): M79.604 - Pain in right leg








- Discharge Dispostion


Disposition: HOME


Condition at time of disposition: Stable





- Referrals


Referrals: 


Carie Leonardo MD [Primary Care Provider] - 





- Patient Instructions


Printed Discharge Instructions:  DI for Leg Pain





- Post Discharge Activity


Forms/Work/School Notes:  Back to Work

## 2019-07-13 NOTE — PDOC
History of Present Illness





- General


Chief Complaint: Pain


Stated Complaint: LT SIDE RIB PAIN


Time Seen by Provider: 07/13/19 09:07


History Source: Patient


Exam Limitations: No Limitations





- History of Present Illness


Initial Comments: 





07/13/19 09:39


34 yr old female presents to ED with complaints of left sided chest pain 

worsened with deep breathing, moving or coughing for the past 2 days. Patient 

states has taken Motrin with moderate effect but since symptoms did not improve 

completely she decided come to the ER. Patient states no recent injury recent 

illness, or discomfort while at rest. 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 

and unsure if she pulled something and so has been taking the Motrin. Patient 

denies palpitations, dizziness, nausea, abdominal pain, rash, or recent travel.


Timing/Duration: intermittent


Severity: mild


Associated Symptoms: reports: denies symptoms.  denies: shortness of breath (at 

rest)





Past History





- Travel


Traveled outside of the country in the last 30 days: No


Close contact w/someone who was outside of country & ill: No





- Past Medical History


Allergies/Adverse Reactions: 


 Allergies











Allergy/AdvReac Type Severity Reaction Status Date / Time


 


oxycodone HCl Allergy Mild Itching Verified 07/13/19 08:55





[From Roxicodone]     


 


acetaminophen [From Percocet] Allergy   Verified 07/13/19 08:55


 


mushroom Allergy   Verified 07/13/19 08:55


 


oxycodone [From Percocet] Allergy   Verified 07/13/19 08:55











Home Medications: 


Ambulatory Orders





Naproxen [Naprosyn -] 375 mg PO BID PRN #20 tablet 07/13/19 








Anemia: No


Asthma: No


Cancer: No


Cardiac Disorders: No


CVA: No


COPD: No


CHF: No


DVT: No


Dementia: No


Diabetes: No


GI Disorders: No


 Disorders: No


HTN: No


Hypercholesterolemia: No


Liver Disease: No


Seizures: No


Thyroid Disease: No





- Immunization History


Immunization Up to Date: Yes





- Suicide/Smoking/Psychosocial Hx


Smoking History: Never smoked


Have you smoked in the past 12 months: No


Information on smoking cessation initiated: No


Hx Alcohol Use: No


Drug/Substance Use Hx: No


Substance Use Type: None


Hx Substance Use Treatment: No


Patient Lives Alone: No


Lives with/in: spouse/SO





**Review of Systems





- Review of Systems


Able to Perform ROS?: No


Is the patient limited English proficient: No


Constitutional: No: Symptoms Reported


HEENTM: No: Symptoms Reported


Respiratory: No: Symptoms reported


Cardiac (ROS): No: Symptoms Reported


ABD/GI: No: Symptoms Reported


: No: Symptoms Reported


Musculoskeletal: Yes: Muscle Pain


Integumentary: No: Symptoms Reported


Neurological: No: Symptoms reported


Endocrine: No: Symptoms Reported


Hematologic/Lymphatic: No: Symptoms Reported





*Physical Exam





- Vital Signs


 Last Vital Signs











Temp Pulse Resp BP Pulse Ox


 


 98.4 F   73   18   114/64   97 


 


 07/13/19 08:55  07/13/19 08:55  07/13/19 08:55  07/13/19 08:55  07/13/19 08:55














- Physical Exam


General Appearance: Yes: Nourished, Appropriately Dressed.  No: Apparent 

Distress


Neck: positive: Supple


Respiratory/Chest: positive: Chest Tender (left 10th rib anteriorly from 

sternum to anterior aspect extending lateral of midclavicular line), Lungs Clear

, Normal Breath Sounds.  negative: Respiratory Distress, Accessory Muscle Use


Cardiovascular: positive: Regular Rhythm, Regular Rate.  negative: Murmur


Gastrointestinal/Abdominal: positive: Soft.  negative: Tenderness


Integumentary: positive: Normal Color, Warm, Moist


Neurologic: positive: Normal Mood/Affect, Motor Strength 5/5





ED Treatment Course





- RADIOLOGY


Radiology Studies Ordered: 














 Category Date Time Status


 


 RIBS-LEFT SIDE [RAD] Stat Radiology  07/13/19 09:23 Ordered














Medical Decision Making





- Medical Decision Making





07/13/19 09:48


Chief complaint: Patient with intermittent left-sided rib pain worsened with 

deep breathing and movement along with coughing for the past 2-3 days. Patient 

took Motrin moderate relief was and symptoms continued she decided come to the 

ER. No other complaints.


Exam. Patient with reproducible left-sided rib pain over the 10th rib without 

other acute findings.


Plan: Toradol IM along with rib x-ray


07/13/19 10:49


The patient states feeling better after receiving Toradol. We'll discharge 

patient home with Naprosyn with recommendations to apply splinting to area and 

if symptoms worsen to return to the ED





*DC/Admit/Observation/Transfer


Diagnosis at time of Disposition: 


 Muscle strain of chest wall








- Discharge Dispostion


Disposition: HOME


Condition at time of disposition: Improved





- Prescriptions


Prescriptions: 


Naproxen [Naprosyn -] 375 mg PO BID PRN #20 tablet


 PRN Reason: Pain





- Referrals


Referrals: 


Carie Leonadro MD [Primary Care Provider] - 





- Patient Instructions


Printed Discharge Instructions:  DI for Muscle Strain


Additional Instructions: 


At this time your chest x-ray was negative. 


I recommend taking Naprosyn for discomfort and splinting area when coughing or 

moving to alleviate discomfort





- Post Discharge Activity

## 2020-03-07 ENCOUNTER — HOSPITAL ENCOUNTER (EMERGENCY)
Dept: HOSPITAL 74 - JER | Age: 35
Discharge: HOME | End: 2020-03-07
Payer: COMMERCIAL

## 2020-03-07 VITALS — DIASTOLIC BLOOD PRESSURE: 68 MMHG | SYSTOLIC BLOOD PRESSURE: 128 MMHG | TEMPERATURE: 98.5 F | HEART RATE: 88 BPM

## 2020-03-07 VITALS — BODY MASS INDEX: 10.3 KG/M2

## 2020-03-07 DIAGNOSIS — R07.9: Primary | ICD-10-CM

## 2020-03-07 LAB
ALBUMIN SERPL-MCNC: 3.9 G/DL (ref 3.4–5)
ALP SERPL-CCNC: 65 U/L (ref 45–117)
ALT SERPL-CCNC: 21 U/L (ref 13–61)
ANION GAP SERPL CALC-SCNC: 7 MMOL/L (ref 8–16)
APTT BLD: 35.1 SECONDS (ref 25.2–36.5)
AST SERPL-CCNC: 17 U/L (ref 15–37)
BASOPHILS # BLD: 1.3 % (ref 0–2)
BILIRUB SERPL-MCNC: 0.3 MG/DL (ref 0.2–1)
BUN SERPL-MCNC: 15.3 MG/DL (ref 7–18)
CALCIUM SERPL-MCNC: 8.9 MG/DL (ref 8.5–10.1)
CHLORIDE SERPL-SCNC: 106 MMOL/L (ref 98–107)
CO2 SERPL-SCNC: 27 MMOL/L (ref 21–32)
CREAT SERPL-MCNC: 0.8 MG/DL (ref 0.55–1.3)
DEPRECATED RDW RBC AUTO: 13.8 % (ref 11.6–15.6)
EOSINOPHIL # BLD: 2.7 % (ref 0–4.5)
GLUCOSE SERPL-MCNC: 83 MG/DL (ref 74–106)
HCT VFR BLD CALC: 37.9 % (ref 32.4–45.2)
HGB BLD-MCNC: 12.5 GM/DL (ref 10.7–15.3)
INR BLD: 0.95 (ref 0.83–1.09)
LYMPHOCYTES # BLD: 39.5 % (ref 8–40)
MAGNESIUM SERPL-MCNC: 2.1 MG/DL (ref 1.8–2.4)
MCH RBC QN AUTO: 27.8 PG (ref 25.7–33.7)
MCHC RBC AUTO-ENTMCNC: 32.9 G/DL (ref 32–36)
MCV RBC: 84.6 FL (ref 80–96)
MONOCYTES # BLD AUTO: 5.6 % (ref 3.8–10.2)
NEUTROPHILS # BLD: 50.9 % (ref 42.8–82.8)
PLATELET # BLD AUTO: 169 K/MM3 (ref 134–434)
PMV BLD: 10.8 FL (ref 7.5–11.1)
POTASSIUM SERPLBLD-SCNC: 3.8 MMOL/L (ref 3.5–5.1)
PROT SERPL-MCNC: 7.1 G/DL (ref 6.4–8.2)
PT PNL PPP: 11.2 SEC (ref 9.7–13)
RBC # BLD AUTO: 4.48 M/MM3 (ref 3.6–5.2)
SODIUM SERPL-SCNC: 140 MMOL/L (ref 136–145)
WBC # BLD AUTO: 4.4 K/MM3 (ref 4–10)

## 2020-03-07 PROCEDURE — 3E0233Z INTRODUCTION OF ANTI-INFLAMMATORY INTO MUSCLE, PERCUTANEOUS APPROACH: ICD-10-PCS

## 2020-03-07 PROCEDURE — 3E033NZ INTRODUCTION OF ANALGESICS, HYPNOTICS, SEDATIVES INTO PERIPHERAL VEIN, PERCUTANEOUS APPROACH: ICD-10-PCS

## 2020-03-07 PROCEDURE — 3E0337Z INTRODUCTION OF ELECTROLYTIC AND WATER BALANCE SUBSTANCE INTO PERIPHERAL VEIN, PERCUTANEOUS APPROACH: ICD-10-PCS

## 2020-03-07 NOTE — PDOC
History of Present Illness





- General


Chief Complaint: Chest Pain


Stated Complaint: CHEST PAIN/LT SIDE PAIN


History Source: Patient


Exam Limitations: No Limitations





- History of Present Illness


Initial Comments: 


35-year-old female with no past medical history presented to the emergency 

department for chest pain since last night. Patient reported her pain begins at 

the left side of her sternum and wraps around the left side of her chest, 

constant, aggravated by movement and inspiration, alleviated by rest, dull in 

character. Patient denied recent heavy lifting, injury, fall, rash, fever, 

recent illnesses, shortness of breath, coughing, lightheadedness, lower 

extremity swelling, calf pain, or mode use, hemoptysis, recent travel, history 

of malignancy, history of DVT or PE. Patient reported the only associated 

symptom is bilateral arm weakness, which she has been feeling for the past four 

days. She reported she made an appointment for the arm weakness with her primary

care doctor, but when she began to chest pain she decided to come to the [facility] 

department. She reported she had her hormone IUD removed around three or four 

months ago, currently using no form of protection while sexually active. She 

denied family history of myocardial infarction. She reported she took Motrin 600

mg last night and this morning with no relief of her symptoms. 





ROS


General: denied fever, chills, generalized weakness.


HEENT: denied sore throat, rhinorrhea, ear pain.


Cardiovascular: admitted to chest pain. denied palpitations, syncope, 

diaphoresis.


Respiratory: denied shortness of breath, cough, sputum production, hemoptysis.


Gastrointestinal: denied abdominal pain, nausea, vomiting, diarrhea, constipati

on, blood in stool.


Genitourinary: denied dysuria, increased urinary frequency, hematuria, urinary i

ncontinence, flank pain.


Back: denied back pain.


Musculoskeletal: denied joint pain, muscle pain, joint swelling.


Neurological: admitted to weakness. denied headache, dizziness, numbness, 

tingling. 


Integumentary: denied rash, laceration, abrasion.


Hematologic/Lymphatic: denied bruising or bleeding. 





PE


Constitutional: Well-nourished, Well-developed, appearing stated age.


HEENT: head is normocephalic, atraumatic. EOMI. PERRLA. 


Neck: supple. Full ROM.


Cardiovascular: regular heart rhythm. Normal S1 and S2. no murmurs. no 

pericardial friction rub. 


Chest: no rash. mild tenderness to palpation of left anterior chest wall. 


Respiratory: clear to auscultation bilaterally. no crackles, rhonchi or 

wheezing. no stridor.


Gastrointestinal: soft, flat, nontender. normal bowel sounds. no rebound, gu

arding, or masses. 


Extremities: peripheral pulses intact and equal. no lower extremity edema noted.




Neurological: CN 2-12 grossly intact. moves all four extremities. 5/5 strength 

bilateral upper extremities. 


Psych: awake, alert, oriented x3. follows commands. answers questions 

appropriately. 








Past History





- Past Medical History


Allergies/Adverse Reactions: 


                                    Allergies











Allergy/AdvReac Type Severity Reaction Status Date / Time


 


oxycodone HCl Allergy Mild Itching Verified 20 17:52





[From Roxicodone]     


 


acetaminophen [From Percocet] Allergy   Verified 20 17:52


 


mushroom Allergy   Verified 20 17:52


 


oxycodone [From Percocet] Allergy   Verified 20 17:52











Home Medications: 


Ambulatory Orders





NK [No Known Home Medication]  20 











*Physical Exam





- Vital Signs


                                Last Vital Signs











Temp Pulse Resp BP Pulse Ox


 


 97.4 F L  66   18   122/66   100 


 


 20 17:52  20 17:52  20 17:52  20 17:52  20 17:52














**Heart Score/ECG Review





- History


History: Slightly suspicious





- Electrocardiogram


EKG: Normal





- Age


Age: </= 45





- Risk Factors


Based on the list above the patient has:: No risk factors known





- Troponin


Troponin: </= normal limit





- Score


Heart Score - Total: 0





ED Treatment Course





- LABORATORY


CBC & Chemistry Diagram: 


                                 20 19:00





                                 20 19:00





- RADIOLOGY


Radiology Studies Ordered: 














 Category Date Time Status


 


 CHEST PA & LAT [RAD] Stat Radiology  20 18:21 Ordered














Medical Decision Making





- Medical Decision Making


35 year old female with above PMH presented to ED for chest pain since last 

night associated with bilateral arm weakness. 





                               Initial Vital Signs











Temp Pulse Resp BP Pulse Ox


 


 97.4 F L  66   18   122/66   100 


 


 20 17:52  20 17:52  20 17:52  20 17:52  20 17:52








Afebrile. 


No tachycardia. 


No tachypnea. 


No hypotension. 


No hypoxia on room air. 





BP Left arm taken by me: 111/77


BP Right arm taken by me: 107/66





EKG performed at 1802: rate 55, regular rhythm, normal axis, normal intervals, 

QTc 403, flipped T V1/V2, no acute ST changes. 





CXR report: Samir Tse Name: IM[facility]IVER[facility] DEPARTMENT OF RADIOLOGY Phys: Zoie Horvath RESIDENT : [id number] Age: 35 Sex: F [facility] 

Acct: [id number] Loc: [address] Exam Date: 20 Status: REG 

[facility] [id number] [phone] ACCESSION #: 

[id number] EXAM#: TYPE/EXAM: RESULT: [id number] RAD/CHEST X-RAY PORTABLE* Chest

 X-Ray: Indication: Left-sided chest pain Prior: None Procedure: Single view of 

the chest is performed. Findings: No osseous abnormalities are identified. 

Cardiac silhouette is within normal limits. Mediastinum is within normal limits 

with no evidence of widening. Aortic knob and descending aorta are well defined.

 There is no focal infiltrate or pleural effusion. No pneumothorax or pulmonary 

contusion is identified. IMPRESSION: No active pulmonary disease. Jose Goodwin M.D. Diplomate, American Board of Radiology CAQ, Neuroradiology CAQ, 

Interventional Radiology 





20 20:23


                             Laboratory Last Values











WBC  4.4 K/mm3 (4.0-10.0)   20  19:00    


 


RBC  4.48 M/mm3 (3.60-5.2)   20  19:00    


 


Hgb  12.5 GM/dL (10.7-15.3)   20  19:00    


 


Hct  37.9 % (32.4-45.2)  D 20  19:00    


 


MCV  84.6 fl (80-96)   20  19:00    


 


MCH  27.8 pg (25.7-33.7)   20  19:00    


 


MCHC  32.9 g/dl (32.0-36.0)   20  19:00    


 


RDW  13.8 % (11.6-15.6)   20  19:00    


 


Plt Count  169 K/MM3 (134-434)   20  19:00    


 


MPV  10.8 fl (7.5-11.1)  D 20  19:00    


 


Absolute Neuts (auto)  2.2 K/mm3 (1.5-8.0)   20  19:00    


 


Neutrophils %  50.9 % (42.8-82.8)  D 20  19:00    


 


Lymphocytes %  39.5 % (8-40)  D 20  19:00    


 


Monocytes %  5.6 % (3.8-10.2)   20  19:00    


 


Eosinophils %  2.7 % (0-4.5)   20  19:00    


 


Basophils %  1.3 % (0-2.0)   20  19:00    


 


Nucleated RBC %  0 % (0-0)   20  19:00    


 


PT with INR  11.20 SEC (9.7-13.0)   20  19:00    


 


INR  0.95  (0.83-1.09)   20  19:00    


 


PTT (Actin FS)  35.1 SECONDS (25.2-36.5)   20  19:00    


 


Sodium  140 mmol/L (136-145)   20  19:00    


 


Potassium  3.8 mmol/L (3.5-5.1)   20  19:00    


 


Chloride  106 mmol/L ()   20  19:00    


 


Carbon Dioxide  27 mmol/L (21-32)   20  19:00    


 


Anion Gap  7 MMOL/L (8-16)  L  20  19:00    


 


BUN  15.3 mg/dL (7-18)   20  19:00    


 


Creatinine  0.8 mg/dL (0.55-1.3)   20  19:00    


 


Est GFR (CKD-EPI)AfAm  110.70   20  19:00    


 


Est GFR (CKD-EPI)NonAf  95.52   20  19:00    


 


Random Glucose  83 mg/dL ()   20  19:00    


 


Calcium  8.9 mg/dL (8.5-10.1)   20  19:00    


 


Magnesium  2.1 mg/dL (1.8-2.4)   20  19:00    


 


Total Bilirubin  0.3 mg/dL (0.2-1)   20  19:00    


 


AST  17 U/L (15-37)   20  19:00    


 


ALT  21 U/L (13-61)   20  19:00    


 


Alkaline Phosphatase  65 U/L ()   20  19:00    


 


Troponin I  < 0.02 ng/ml (0.00-0.05)   20  19:00    


 


Total Protein  7.1 g/dl (6.4-8.2)   20  19:00    


 


Albumin  3.9 g/dl (3.4-5.0)   20  19:00    


 


Beta HCG, Quant  < 1.0 mIU/ml  20  19:00    








No leukocytosis. 


No anemia. 


No AYDEN. 


No transaminitis. 


Troponin undetectable. 


Negative serum pregnancy. 





Pt reported no improvement of pain. Pt reported last time she was in the ED she 

received a shot of Toradol that worked well for her, she requested to have the 

same. 





Medications ordered: Toradol 60 mg IM once





20 21:09


Pt reported much improvement of symptoms. 


Pt discharged. 





Discharge





- Discharge Information


Problems reviewed: Yes


Clinical Impression/Diagnosis: 


 Chest pain





Condition: Improved


Disposition: HOME





- Admission


No





- Follow up/Referral


Referrals: 


Carie Leonardo MD [Primary Care Provider] - 





- Patient Discharge Instructions


Patient Printed Discharge Instructions:  DI for Atypical Chest Pain


Additional Instructions: 


Follow up with your primary care doctor within 3 days regarding your ER visit. 

Your care is not complete until you follow up. Bring all paperwork given to you 

today to your appointment. 





Take Ibuprofen 600 mg every 8 hours as needed for pain. Buy over the counter. 

Take with food as this medicine can irritate your stomach.


Take Tylenol 1000 mg every 8 hours as needed for pain. Buy over the counter. Do 

not take more than 4000 mg in 24 hours, as this is toxic.


Tylenol and Ibuprofen are not the same medication and can be safely used 

together. 





Do not perform heavy lifting or exercise for the next 7 days or until your s

ymptoms resolve.  





Return to the Emergency Department for increasing pain, vomiting, fever, lighthe

adedness, shortness of breath, severe back pain, severe abdominal pain, passing 

out, or any other new, worsening or concerning factors. 





- Post Discharge Activity


Work/Back to School Note:  Back to Work

## 2020-03-07 NOTE — PDOC
Documentation entered by Ronald Yeager SCRIBE, acting as scribe for 

Dee Dee Mora MD.








Dee Dee Mora MD:  This documentation has been prepared by the faribaibeMichael Nirvannie, SCRIBE, under my direction and personally reviewed by me in its

entirety.  I confirm that the documentation accurately reflects all work, 

treatment, procedures, and medical decision making performed by me.  





Attending Attestation





- Resident


Resident Name: SimranZoie





- ED Attending Attestation


I have performed the following: I have examined & evaluated the patient, The 

case was reviewed & discussed with the resident, I agree w/resident's findings &

plan, Exceptions are as noted





- HPI


HPI: 





03/07/20 19:03


35YOF with no significant past medical history who presents to the ED with 2 

days of left-sided constant, dull, pleuritic sternal chest pain that wraps 

around to the left side of the chest worsened with movement and alleviated with 

rest. Patient notes associated 4 days of bilateral upper extremity weakness 

which she has a pending appointment with her PCP to evaluate. She notes taking 

600mg of Ibuprofen, without relief, prompting her arrival to the ED.





Denies fever, chills, SOB, palpitation, dizziness, weakness, N, V, D, abdominal 

pain, bladder and bowel problems, leg swelling, No sick contacts or travel. No 

new changes in medications.





Allergies: Oxycodone, Acetaminophen, Mushroom


Past Medical History: None reported.


Social history: Lives with family. No tobacco, ETOH or drug use.


Surgical history: None reported.


Meds: as documented in EMR


PMD: Dr. Leonardo








- Physicial Exam


PE: 





03/07/20 18:57


NAD, well appearing, EOMI, PERRL, MMM, nl conjunctiva, anicteric; neck supple. 

lungs clear, RRR, no murmur, abdomen soft nontender. Back nontender. PABLO x4, no 

focal neuro deficits. No peripheral edema. normal color for ethnicity, WWP.





03/07/20 19:32








- Medical Decision Making





03/07/20 19:09


Vital Signs











Temp Pulse Resp BP Pulse Ox


 


 97.4 F L  66   18   122/66   100 


 


 03/07/20 17:52  03/07/20 17:52  03/07/20 17:52  03/07/20 17:52  03/07/20 17:52





DDx chest pain: ACS, coronary vasospasm, NSTEMI, arrhythmia, unstable angina, 

PE, dissection, PUD, esophageal spasm, GERD, gastritis, costochondritis, 

pneumonia, pleurisy, pericarditis/myocarditis. dehydration, 

electrolyte/metabolic derangements. 


Considered but clinically doubt based on HPI and PE: Low suspicion for pulmonary

embolism or dissection. 


perc neg so unlikely PE





Chest pain negative: No evidence of ACS, pericarditis, myocarditis, pulmonary em

bolism, pneumothorax, pneumonia, Zoster, or esophageal perforation. Historically

not abrupt in onset, tearing or ripping, pulses symmetric, no evidence of aortic

dissection.





VS reviewed, wnl.


normal HR, normal sats and no respiratory distress





EKG  sinus bradycardia, no interval abnormalities, narrow QRS, ST and T wave 

segments and morphology normal. Nonspecific T wave abnormalities, unchanged from

prior


Chest pain HEART score zero which denotes


Low risk and probability for ACS, less than 1.7% risk for MACE at 4-6 wks





labs and lytes wnl


trop is neg, reassuring. one is sufficient as last night the episode started


given analgesia here, reassess


feels improved after the toradol


chest pain improved





03/07/20 19:32


Pt to be discharged in stable condition. Patient  made aware of clinical impre

ssion, treatment recommendations and disposition plan, return precautions 

discussed (including but not limited to new or persistent/worsening symptoms, 

pain, fevers, or signs of infection, chest pain, respiratory distress, inability

to tolerate oral intake, dehydration, syncope, or neurologic changes). Follow up

with PMD as recommended, follow up information provided, take medications as 

instructed for duration of time. continue with supportive care, avoid triggers 

and precipitants. All questions answered to patient's satisfaction and expressed

understanding and comfort with this. At the time of discharge, the patient is 

alert, clinically improved, tolerating po and verbalizes understanding of 

instructions, satisfied with the care received and felt comfortable with the 

plan. Patient does not suffer from an acute life-threatening medical condition 

at this time and  is safe for outpatient follow-up. 





03/07/20 21:19








**Heart Score/ECG Review





- History


History: Slightly suspicious





- Electrocardiogram


EKG: Normal





- Age


Age: </= 45





- Risk Factors


Based on the list above the patient has:: No risk factors known





- Troponin


Troponin: </= normal limit





- Score


Heart Score - Total: 0


  ** #1


ECG reviewed & interpreted by me at: 18:05


General ECG Interpretation: Sinus Rhythm, Normal Intervals





03/07/20 19:08


EKG sinus bradycardia 55 bpm, no interval abnormalities, narrow QRS, ST and T 

wave segments and morphology normal. Nonspecific T wave abnormalities

## 2020-03-09 NOTE — EKG
Test Reason : 

Blood Pressure : ***/*** mmHG

Vent. Rate : 055 BPM     Atrial Rate : 055 BPM

   P-R Int : 152 ms          QRS Dur : 072 ms

    QT Int : 422 ms       P-R-T Axes : 049 043 048 degrees

   QTc Int : 403 ms

 

SINUS BRADYCARDIA

ABNORMAL ECG

WHEN COMPARED WITH ECG OF 12-AUG-2016 14:17,

NO SIGNIFICANT CHANGE WAS FOUND

Confirmed by Veronique Forbes ([id number]) on 3/9/2020 9:36:12 AM

 

Referred By:             Confirmed By:Veronique Forbes

## 2021-03-04 ENCOUNTER — HOSPITAL ENCOUNTER (INPATIENT)
Dept: HOSPITAL 74 - JLDR | Age: 36
LOS: 3 days | Discharge: HOME | End: 2021-03-07
Attending: OBSTETRICS & GYNECOLOGY | Admitting: OBSTETRICS & GYNECOLOGY
Payer: COMMERCIAL

## 2021-03-04 VITALS — BODY MASS INDEX: 30.7 KG/M2

## 2021-03-04 DIAGNOSIS — Z87.19: ICD-10-CM

## 2021-03-04 DIAGNOSIS — Z98.890: ICD-10-CM

## 2021-03-04 DIAGNOSIS — Z3A.38: ICD-10-CM

## 2021-03-04 DIAGNOSIS — O34.219: Primary | ICD-10-CM

## 2021-03-04 DIAGNOSIS — Z86.2: ICD-10-CM

## 2021-03-04 LAB
BASE EXCESS BLDCOA CALC-SCNC: -3.2 MMOL/L (ref 0–2)
BASE EXCESS BLDCOA CALC-SCNC: -3.5 MMOL/L (ref 0–2)
HCO3 BLDCO-SCNC: 22.3 MMHG (ref 20–29)
HCO3 BLDCO-SCNC: 24.5 MMHG (ref 20–29)
PCO2 BLDCO: 41.9 MMHG (ref 30–78)
PCO2 BLDCO: 56 MMHG (ref 30–78)
PH BLDCO: 7.26 [PH] (ref 7.14–7.44)
PH BLDCO: 7.34 [PH] (ref 7.14–7.44)

## 2021-03-04 RX ADMIN — IBUPROFEN PRN MG: 800 INJECTION INTRAVENOUS at 15:44

## 2021-03-05 LAB
DEPRECATED RDW RBC AUTO: 13.9 % (ref 11.6–15.6)
HCT VFR BLD CALC: 32.3 % (ref 32.4–45.2)
HGB BLD-MCNC: 11 GM/DL (ref 10.7–15.3)
MCH RBC QN AUTO: 29.5 PG (ref 25.7–33.7)
MCHC RBC AUTO-ENTMCNC: 34.2 G/DL (ref 32–36)
MCV RBC: 86.3 FL (ref 80–96)
PLATELET # BLD AUTO: 148 K/MM3 (ref 134–434)
PMV BLD: 10.8 FL (ref 7.5–11.1)
RBC # BLD AUTO: 3.74 M/MM3 (ref 3.6–5.2)
WBC # BLD AUTO: 7.8 K/MM3 (ref 4–10)

## 2021-03-05 RX ADMIN — SIMETHICONE CHEW TAB 80 MG PRN MG: 80 TABLET ORAL at 21:08

## 2021-03-05 RX ADMIN — ACETAMINOPHEN PRN MG: 325 TABLET ORAL at 08:19

## 2021-03-05 RX ADMIN — IBUPROFEN PRN MG: 600 TABLET, FILM COATED ORAL at 15:48

## 2021-03-05 RX ADMIN — ACETAMINOPHEN PRN MG: 325 TABLET ORAL at 21:07

## 2021-03-05 RX ADMIN — ACETAMINOPHEN PRN MG: 325 TABLET ORAL at 15:49

## 2021-03-05 RX ADMIN — IBUPROFEN PRN MG: 600 TABLET, FILM COATED ORAL at 08:18

## 2021-03-05 RX ADMIN — SIMETHICONE CHEW TAB 80 MG PRN MG: 80 TABLET ORAL at 08:19

## 2021-03-05 RX ADMIN — IBUPROFEN PRN MG: 600 TABLET, FILM COATED ORAL at 21:08

## 2021-03-05 RX ADMIN — IBUPROFEN PRN MG: 800 INJECTION INTRAVENOUS at 02:38

## 2021-03-06 RX ADMIN — ACETAMINOPHEN PRN MG: 325 TABLET ORAL at 09:42

## 2021-03-06 RX ADMIN — ACETAMINOPHEN PRN MG: 325 TABLET ORAL at 21:22

## 2021-03-06 RX ADMIN — IBUPROFEN PRN MG: 600 TABLET, FILM COATED ORAL at 21:25

## 2021-03-06 RX ADMIN — IBUPROFEN PRN MG: 600 TABLET, FILM COATED ORAL at 09:42

## 2021-03-06 RX ADMIN — SIMETHICONE CHEW TAB 80 MG PRN MG: 80 TABLET ORAL at 09:42

## 2021-03-07 VITALS — HEART RATE: 72 BPM | SYSTOLIC BLOOD PRESSURE: 109 MMHG | DIASTOLIC BLOOD PRESSURE: 71 MMHG | TEMPERATURE: 98.1 F

## 2021-03-07 LAB
DEPRECATED RDW RBC AUTO: 14.1 % (ref 11.6–15.6)
HCT VFR BLD CALC: 32.3 % (ref 32.4–45.2)
HGB BLD-MCNC: 10.9 GM/DL (ref 10.7–15.3)
MCH RBC QN AUTO: 29.4 PG (ref 25.7–33.7)
MCHC RBC AUTO-ENTMCNC: 33.8 G/DL (ref 32–36)
MCV RBC: 87.1 FL (ref 80–96)
PLATELET # BLD AUTO: 151 K/MM3 (ref 134–434)
PMV BLD: 10.5 FL (ref 7.5–11.1)
RBC # BLD AUTO: 3.71 M/MM3 (ref 3.6–5.2)
WBC # BLD AUTO: 4.3 K/MM3 (ref 4–10)